# Patient Record
Sex: MALE | Race: WHITE | NOT HISPANIC OR LATINO | Employment: FULL TIME | URBAN - NONMETROPOLITAN AREA
[De-identification: names, ages, dates, MRNs, and addresses within clinical notes are randomized per-mention and may not be internally consistent; named-entity substitution may affect disease eponyms.]

---

## 2017-10-17 ENCOUNTER — APPOINTMENT (OUTPATIENT)
Dept: CHIROPRACTIC MEDICINE | Facility: OTHER | Age: 32
End: 2017-10-17

## 2017-10-17 ENCOUNTER — APPOINTMENT (OUTPATIENT)
Dept: OCCUPATIONAL MEDICINE | Facility: OTHER | Age: 32
End: 2017-10-17

## 2017-10-17 PROCEDURE — 92552 PURE TONE AUDIOMETRY AIR: CPT

## 2017-10-17 PROCEDURE — 97799 UNLISTED PHYSCL MED/REHAB PX: CPT

## 2017-10-17 PROCEDURE — 92499 UNLISTED OPH SVC/PROCEDURE: CPT

## 2017-10-17 PROCEDURE — 99000 SPECIMEN HANDLING OFFICE-LAB: CPT

## 2017-10-17 PROCEDURE — 99499 UNLISTED E&M SERVICE: CPT

## 2018-09-05 ENCOUNTER — APPOINTMENT (OUTPATIENT)
Dept: CHIROPRACTIC MEDICINE | Facility: OTHER | Age: 33
End: 2018-09-05

## 2018-09-05 ENCOUNTER — APPOINTMENT (OUTPATIENT)
Dept: OCCUPATIONAL MEDICINE | Facility: OTHER | Age: 33
End: 2018-09-05

## 2018-09-05 PROCEDURE — 99499 UNLISTED E&M SERVICE: CPT

## 2019-05-28 ENCOUNTER — APPOINTMENT (OUTPATIENT)
Dept: OCCUPATIONAL MEDICINE | Facility: OTHER | Age: 34
End: 2019-05-28

## 2019-05-28 PROCEDURE — 99000 SPECIMEN HANDLING OFFICE-LAB: CPT

## 2020-08-19 ENCOUNTER — APPOINTMENT (OUTPATIENT)
Dept: CHIROPRACTIC MEDICINE | Facility: OTHER | Age: 35
End: 2020-08-19

## 2020-08-19 ENCOUNTER — APPOINTMENT (OUTPATIENT)
Dept: OCCUPATIONAL MEDICINE | Facility: OTHER | Age: 35
End: 2020-08-19

## 2020-08-19 PROCEDURE — 99499 UNLISTED E&M SERVICE: CPT

## 2021-09-13 ENCOUNTER — ANCILLARY PROCEDURE (OUTPATIENT)
Dept: GENERAL RADIOLOGY | Facility: OTHER | Age: 36
End: 2021-09-13
Attending: PHYSICAL MEDICINE & REHABILITATION
Payer: OTHER GOVERNMENT

## 2021-09-13 DIAGNOSIS — S72.002A CLOSED FRACTURE OF LEFT HIP, INITIAL ENCOUNTER (H): ICD-10-CM

## 2021-09-13 DIAGNOSIS — S72.002A HIP FRACTURE, LEFT, CLOSED, INITIAL ENCOUNTER (H): ICD-10-CM

## 2021-09-13 PROCEDURE — 72100 X-RAY EXAM L-S SPINE 2/3 VWS: CPT | Mod: TC | Performed by: RADIOLOGY

## 2021-09-13 PROCEDURE — 73502 X-RAY EXAM HIP UNI 2-3 VIEWS: CPT | Mod: TC | Performed by: RADIOLOGY

## 2022-08-02 ENCOUNTER — APPOINTMENT (OUTPATIENT)
Dept: CHIROPRACTIC MEDICINE | Facility: OTHER | Age: 37
End: 2022-08-02

## 2022-08-02 ENCOUNTER — APPOINTMENT (OUTPATIENT)
Dept: OCCUPATIONAL MEDICINE | Facility: OTHER | Age: 37
End: 2022-08-02

## 2022-08-02 PROCEDURE — 99499 UNLISTED E&M SERVICE: CPT

## 2024-08-01 ENCOUNTER — APPOINTMENT (OUTPATIENT)
Dept: CHIROPRACTIC MEDICINE | Facility: OTHER | Age: 39
End: 2024-08-01

## 2024-08-01 ENCOUNTER — APPOINTMENT (OUTPATIENT)
Dept: OCCUPATIONAL MEDICINE | Facility: OTHER | Age: 39
End: 2024-08-01

## 2024-08-01 PROCEDURE — 99499 UNLISTED E&M SERVICE: CPT

## 2024-10-02 ENCOUNTER — HOSPITAL ENCOUNTER (EMERGENCY)
Facility: HOSPITAL | Age: 39
Discharge: HOME | End: 2024-10-02
Attending: NURSE PRACTITIONER | Admitting: NURSE PRACTITIONER
Payer: OTHER MISCELLANEOUS

## 2024-10-02 ENCOUNTER — APPOINTMENT (OUTPATIENT)
Dept: GENERAL RADIOLOGY | Facility: HOSPITAL | Age: 39
End: 2024-10-02
Attending: NURSE PRACTITIONER
Payer: OTHER MISCELLANEOUS

## 2024-10-02 VITALS
TEMPERATURE: 98.1 F | OXYGEN SATURATION: 97 % | RESPIRATION RATE: 16 BRPM | SYSTOLIC BLOOD PRESSURE: 149 MMHG | HEART RATE: 65 BPM | DIASTOLIC BLOOD PRESSURE: 79 MMHG

## 2024-10-02 DIAGNOSIS — M54.50 LOWER BACK PAIN: Primary | ICD-10-CM

## 2024-10-02 PROCEDURE — 72100 X-RAY EXAM L-S SPINE 2/3 VWS: CPT

## 2024-10-02 PROCEDURE — G0463 HOSPITAL OUTPT CLINIC VISIT: HCPCS

## 2024-10-02 PROCEDURE — 99213 OFFICE O/P EST LOW 20 MIN: CPT | Performed by: NURSE PRACTITIONER

## 2024-10-02 RX ORDER — CYCLOBENZAPRINE HCL 10 MG
10 TABLET ORAL 2 TIMES DAILY PRN
Qty: 30 TABLET | Refills: 0 | Status: SHIPPED | OUTPATIENT
Start: 2024-10-02

## 2024-10-02 RX ORDER — PREDNISONE 10 MG/1
40 TABLET ORAL DAILY
Qty: 30 TABLET | Refills: 0 | Status: SHIPPED | OUTPATIENT
Start: 2024-10-02 | End: 2024-10-07

## 2024-10-02 ASSESSMENT — ENCOUNTER SYMPTOMS
SHORTNESS OF BREATH: 0
PSYCHIATRIC NEGATIVE: 1
NECK PAIN: 0
FEVER: 0
FREQUENCY: 0
MYALGIAS: 1
DYSURIA: 0
DIARRHEA: 0
NAUSEA: 0
VOMITING: 0
CHILLS: 0
NECK STIFFNESS: 0
FLANK PAIN: 0
BACK PAIN: 1
HEMATURIA: 0

## 2024-10-02 ASSESSMENT — COLUMBIA-SUICIDE SEVERITY RATING SCALE - C-SSRS
1. IN THE PAST MONTH, HAVE YOU WISHED YOU WERE DEAD OR WISHED YOU COULD GO TO SLEEP AND NOT WAKE UP?: NO
6. HAVE YOU EVER DONE ANYTHING, STARTED TO DO ANYTHING, OR PREPARED TO DO ANYTHING TO END YOUR LIFE?: NO
2. HAVE YOU ACTUALLY HAD ANY THOUGHTS OF KILLING YOURSELF IN THE PAST MONTH?: NO

## 2024-10-02 NOTE — Clinical Note
Neerajdavid Sharp was seen and treated in our emergency department on 10/2/2024.    Please excuse from work on 10/1/2024 through 10/4/2024.  Thank you     Sincerely,     [facility]

## 2024-10-02 NOTE — ED PROVIDER NOTES
History     Chief Complaint   Patient presents with    Back Pain     HPI  Neeraj Sharp is a 39 year old male who presents to urgent care today ambulatory with complaints of lower back pain which started yesterday while patient was at work.  Patient states that he was lifting approximately 50 to 60 pounds and twisting when the pain started.  Denies any fall or injury.  Denies any significant back history.  Denies any bowel or bladder dysfunction.  Denies any saddle anesthesia.  Denies any numbness or weakness of bilateral lower extremities and attributed to pain.  Denies any fever, chills, nausea, vomiting, diarrhea, shortness of breath or chest pain.  Has been taking APAP and ibuprofen for pain.  No other concerns.    Allergies:  No Known Allergies    Problem List:    There are no problems to display for this patient.       Past Medical History:    No past medical history on file.    Past Surgical History:    No past surgical history on file.    Family History:    No family history on file.    Social History:  Marital Status:  Single [1]        Medications:    cyclobenzaprine (FLEXERIL) 10 MG tablet  predniSONE (DELTASONE) 10 MG tablet      Review of Systems   Constitutional:  Negative for chills and fever.   Respiratory:  Negative for shortness of breath.    Cardiovascular:  Negative for chest pain.   Gastrointestinal:  Negative for diarrhea, nausea and vomiting.   Genitourinary:  Negative for dysuria, flank pain, frequency and hematuria.   Musculoskeletal:  Positive for back pain and myalgias. Negative for gait problem, neck pain and neck stiffness.   Skin: Negative.    Psychiatric/Behavioral: Negative.       Physical Exam   BP: 150/93  Pulse: 79  Temp: 98.1  F (36.7  C)  Resp: 16  SpO2: 98 %    Physical Exam  Vitals and nursing note reviewed.   Constitutional:       General: He is not in acute distress.     Appearance: Normal appearance. He is not ill-appearing or toxic-appearing.   Cardiovascular:       Rate and Rhythm: Normal rate and regular rhythm.      Pulses: Normal pulses.      Heart sounds: Normal heart sounds.   Pulmonary:      Effort: Pulmonary effort is normal.      Breath sounds: Normal breath sounds.   Musculoskeletal:      Cervical back: Normal.      Thoracic back: Normal.      Lumbar back: Spasms and tenderness present. No swelling, deformity, signs of trauma, lacerations or bony tenderness. Normal range of motion.   Skin:     General: Skin is warm and dry.      Capillary Refill: Capillary refill takes less than 2 seconds.   Neurological:      Mental Status: He is alert.   Psychiatric:         Mood and Affect: Mood normal.       ED Course     Procedures    Results for orders placed or performed during the hospital encounter of 10/02/24 (from the past 24 hour(s))   Lumbar spine XR, 2-3 views    Narrative    PROCEDURE: XR LUMBAR SPINE 2/3 VIEWS 10/2/2024 6:36 PM    HISTORY: lower back pain    COMPARISONS: None.    TECHNIQUE: AP and lateral    FINDINGS: The lumbar disks are normal in height. Lumbar vertebral  bodies and arches are intact. Sacrum and sacroiliac joints appear  normal.         Impression    IMPRESSION: Normal lumbar spine    SARA WOLFE MD         SYSTEM ID:  [id number]       Medications - No data to display    Assessments & Plan (with Medical Decision Making)     I have reviewed the nursing notes.    I have reviewed the findings, diagnosis, plan and need for follow up with the patient.  (M54.50) Lower back pain  Plan:   Patient ambulatory with a nontoxic appearance.  Patient able to stand from a seated position in order to ambulate.  Lower back pain consistent with SI joints bilaterally.  No red flag symptoms.  X-ray shows normal lumbar spine.  No fall or injury.  No significant back history.  No fever, chills, nausea, vomiting, diarrhea, shortness of breath or chest pain.  Will start patient on short course of prednisone for pain and inflammation and cyclobenzaprine as needed for  muscle spasms.  Gentle stretching.  Alternate ice and heat.  Topical anesthetics such as Bengay, IcyHot, Biofreeze or Lidoderm patches as needed.  Follow-up with primary care provider or return to urgent care/ED with any worsening in condition or additional concerns.  Patient in agreement treatment plan.    New Prescriptions    CYCLOBENZAPRINE (FLEXERIL) 10 MG TABLET    Take 1 tablet (10 mg) by mouth 2 times daily as needed for muscle spasms.    PREDNISONE (DELTASONE) 10 MG TABLET    Take 4 tablets (40 mg) by mouth daily for 5 days.     Final diagnoses:   Lower back pain     10/2/2024   [facility]       Deirdre Greer NP  10/02/24 [id number]

## 2024-10-02 NOTE — ED TRIAGE NOTES
Pt states that yesterday around 1000 pt was at work moving something off of a belt, and he felt a sharp pain in his lower back, like a stabbing needle. Pt states his back has hurt since. Pt has tried ibuprofen and tylenol with very little relief. Pt denies any loss of bowel or bladder control. Pt does state that the pain radiates up his back, down his hips and into his legs.

## 2024-10-02 NOTE — DISCHARGE INSTRUCTIONS
Prednisone as ordered    Cyclobenzaprine as needed for muscle spasms    Gentle stretching    Alternate ice and heat for pain    Topical anesthetics such as Bengay, IcyHot, Biofreeze and Lidoderm patches as needed    Follow-up with primary care provider or return to urgent care/ED with any worsening in condition or additional concerns.

## 2025-05-30 ENCOUNTER — APPOINTMENT (OUTPATIENT)
Dept: OCCUPATIONAL MEDICINE | Facility: OTHER | Age: 40
End: 2025-05-30

## 2025-05-30 PROCEDURE — 99000 SPECIMEN HANDLING OFFICE-LAB: CPT
